# Patient Record
(demographics unavailable — no encounter records)

---

## 2018-01-13 NOTE — ED GU-MALE
General


Chief Complaint:  -Male


Stated Complaint:  CANT URINATE


Nursing Triage Note:  


c/o urinary urgency and unable to void. States he has had urinary symptoms x 6 


months but has been unable to void this morning.


Source:  patient, other (Ex-wife)


Exam Limitations:  no limitations





History of Present Illness


Time seen by provider:  16:16


Initial Comments


Patient resists ER by private conveyance with chief complaint that for the past 

several days she's had bristly worsening severe pain while urinating some small 

amount of purulent looking discharge from urethra and discomfort in his 

suprapubic region and feels that he takes 10-15 minutes to get any urine out 

and starting a stream is difficult for him. He has no known history of 

prostatic hypertrophy or prostatic cancer. He has had one surgery on his 

abdomen to remove a length of colon for diverticulitis. He has not have any 

history of radiation or chemotherapy. He states he is on monogamous 

relationship and that his partner was recently tested and she told him she was 

cleaning. He's had no fevers, nausea, vomiting, chills, diarrhea, constipation. 

No history of trauma. He feels pain and swelling in the bottom side of the 

shaft of his penis. His testicles are non-painful or swollen. He has 

dyspareunia. He is not known to any primary doctor or urologist. He says this 

first started over 10 years ago when he had a surgery on his colon he felt that 

the Mancini catheter left his urethra burning off and on ever since then.





Allergies and Home Medications


Allergies


Coded Allergies:  


     Albuterol (Unverified  Adverse Reaction, Mild, HOT, TINGLY, SHAKING, 3/11/

08)





Constitutional:  No chills, No diaphoresis, No fever


Respiratory:  No cough, No short of breath


Cardiovascular:  No chest pain, No palpitations, No syncope


Gastrointestinal:  abdominal pain (suprapubic mild tenderness and distention), 

No constipation, No diarrhea, No nausea, No vomiting


Genitourinary:  burning, discharge, dysuria, pain, other (urinary hesitancy and 

dyspareunia)


Skin:  No pruritus, No rash





Past Medical-Social-Family Hx


Patient Social History


Alcohol Use:  Denies Use


Recreational Drug Use:  No


Smoking Status:  Current Everyday Smoker


Type Used:  Cigarettes


Recent Foreign Travel:  No


Contact w/Someone Who Travel:  No


Recent Infectious Disease Expo:  No


Recent Hopitalizations:  Yes (9/12/07 DIVERTICULITIS, BOWEL SURGERY, BRONCHITIS

, ET STREP THROAT WHEN PT )





Surgeries


History of Surgeries:  Yes (PARTIAL EXCISION OF LARGE INTESTINE)





Respiratory


History of Respiratory Disorde:  No





Cardiovascular


History of Cardiac Disorders:  No





Neurological


History of Neurological Disord:  No





Reproductive System


Hx Reproductive Disorders:  No


Sexually Transmitted Disease:  Yes (GONORRHEA)





Gastrointestinal


History of Gastrointestinal Di:  Yes





Endocrine


History of Endocrine Disorders:  No





Psychosocial


History of Psychiatric Problem:  No





Blood Transfusions


History of Blood Disorders:  No





Physical Exam


Vital Signs





Vital Sign - Last 12Hours








 1/13/18





 15:48


 


Temp 98.8


 


Pulse 90


 


Resp 16


 


B/P (MAP) 159/107 (124)


 


O2 Delivery Room Air





Capillary Refill : Less Than 3 Seconds


General Appearance:  WD/WN, no apparent distress


HEENT:  PERRL/EOMI, pharynx normal


Neck:  non-tender, normal inspection


Cardiovascular:  normal peripheral pulses, regular rate, rhythm, no edema


Respiratory:  chest non-tender, lungs clear, normal breath sounds


Gastrointestinal:  normal bowel sounds, soft, tenderness (mild tenderness over 

a distended bladder and the superpubic region)


Male:  other (penis has tenderness around the urethra and a small ecchymotic 

looking discoloration at the urethral orifice with a scant amount of purulent 

drainage when expressed. Testicles are's nonswollen nontender and without 

scrotal effusion.)


Neurologic/Psychiatric:  alert, oriented x 3


Skin:  normal color, warm/dry





Progress/Results/Core Measures


Suspected Sepsis


Recent Fever Within 48 Hours:  No


Infection Criteria Present:  Suspected New Infection


New/Unexplained  Altered Menta:  No


Sepsis Screen:  No Definite Risk


Sepsis Diagnosis:  


SIRS


Temperature:98.8 


Pulse: 90 


Respiratory Rate: 16


 


Blood Pressure 159 /107 


Mean: 124





Results/Orders


Lab Results





Laboratory Tests








Test


  1/13/18


16:55 Range/Units


 


 


Urine Color YELLOW   


 


Urine Clarity


  SLIGHTLY


CLOUDY  


 


 


Urine pH 6  5-9  


 


Urine Specific Gravity 1.015 L 1.016-1.022  


 


Urine Protein 1+ H NEGATIVE  


 


Urine Glucose (UA) NEGATIVE  NEGATIVE  


 


Urine Ketones NEGATIVE  NEGATIVE  


 


Urine Nitrite NEGATIVE  NEGATIVE  


 


Urine Bilirubin NEGATIVE  NEGATIVE  


 


Urine Urobilinogen NORMAL  NORMAL  MG/DL


 


Urine Leukocyte Esterase 2+ H NEGATIVE  


 


Urine RBC (Auto) 4+ H NEGATIVE  


 


Urine RBC 5-10 H  /HPF


 


Urine WBC 25-50 H  /HPF


 


Urine Squamous Epithelial


Cells 5-10 


   /HPF


 


 


Urine Crystals NONE   /LPF


 


Urine Bacteria TRACE   /HPF


 


Urine Casts NONE   /LPF


 


Urine Mucus NEGATIVE   /LPF


 


Urine Culture Indicated YES   








My Orders





Orders - LEYLA ENRIQUEZ


Bladder Scan (1/13/18 15:42)


Ua Culture If Indicated (1/13/18 15:42)


Ceftriaxone Injection (Rocephin Injectio (1/13/18 16:30)


Lidocaine 1% Injection (Xylocaine 1% Inj (1/13/18 16:30)


Azithromycin Tablet (Zithromax Tablet) (1/13/18 16:30)


Bladder Scan (1/13/18 16:22)


Mancini Cath Insertion (1/13/18 16:22)


Neis Vlad Dna Urine Test (1/13/18 16:30)


Chlamydia Dna Urine Test (1/13/18 16:30)


Urine Culture (1/13/18 16:55)





Medications Given in ED





Current Medications








 Medications  Dose


 Ordered  Sig/Gen


 Route  Start Time


 Stop Time Status Last Admin


Dose Admin


 


 Azithromycin  1,000 mg  ONCE  ONCE


 PO  1/13/18 16:30


 1/13/18 16:31 DC 1/13/18 17:30


1,000 MG


 


 Ceftriaxone Sodium  250 mg  ONCE  ONCE


 IM  1/13/18 16:30


 1/13/18 16:31 DC 1/13/18 17:31


250 MG


 


 Lidocaine HCl  0.9 ml  ONCE  ONCE


 INJ  1/13/18 16:30


 1/13/18 16:31 DC 1/13/18 17:31


0.9 ML








Vital Signs/I&O





Vital Sign - Last 12Hours








 1/13/18 1/13/18 1/13/18





 15:48 17:31 17:31


 


Temp 98.8 98.8 98.8


 


Pulse 90  


 


Resp 16  


 


B/P (MAP) 159/107 (124)  


 


O2 Delivery Room Air  





Capillary Refill : Less Than 3 Seconds








Blood Pressure Mean:  124








Progress Note :  


   Time:  17:41


Progress Note


Patient states he feels very much relieved after having the Mancini catheter 

placed. We did some teaching. We will talk with the urologist and get him some 

outpatient follow-up. Urinalysis does demonstrate evidence of infection. He's 

gotten the Rocephin and azithromycin and urologist a recommends Vibramycin for 

outpatient therapy and follow-up in the clinic.





Consults


Consults :  


   Consulting Physician:  TAWIL,ELIAS A MD


Consults Notes


Discussed case the labs and findings and he recommends outpatient treatment 

with Vibramycin for 100 mg twice a day 10 days to help a gram positives and 

gram negatives. He feels that it's likely the patient if he's been expressing 

urethritis for this many years establish some large strictures that will need 

treatment in the clinic so he recommends keep Mancini catheter and call him 

Monday or Tuesday to get an appointment to be seen in the clinic.





Departure


Impression


Impression:  


 Primary Impression:  


 Urinary tract infection


 Qualified Codes:  N30.01 - Acute cystitis with hematuria


 Additional Impressions:  


 Urethritis


 Urinary hesitancy


Disposition:  01 HOME, SELF-CARE


Condition:  Improved





Departure-Patient Inst.


Decision time for Depature:  17:43


Referrals:  


NO,LOCAL PHYSICIAN (PCP/Family)


Primary Care Physician


Patient Instructions:  Acute Cystitis (DC), Mancini Catheter, Male





Add. Discharge Instructions:  


Drink plenty of fluids. Keep the Mancini catheter cleaned with your regular soap 

and water. Keep the bag below the level of your bladder at all times to prevent 

refluxing urine into your bladder. Go to the pharmacy and  the 

Vibramycin and take one capsule 100 mg twice a day for the next 10 days. Monday 

or Tuesday call Dr. Tawil, urology at [phone] for an appointment this week.





All discharge instructions reviewed with patient and/or family. Voiced 

understanding.


Scripts


Doxycycline Hyclate (Vibramycin) 100 Mg Capsule


100 MG PO BID for 10 Days, #20 CAP 0 Refills


   Prov: LEYLA ENRIQUEZ         1/13/18





Copy


Copies To 1:   TAWIL,ELIAS A MD WELLER,TITUS J Jan 13, 2018 16:29

## 2020-07-31 NOTE — XMS REPORT
Continuity of Care Document

                             Created on: 2020



SHERINE RAMIREZ

External Reference #: [id number]

: 1973

Sex: Male



Demographics





                          Address                   NONE

[place] [address]

 

                          Home Phone                [phone] x

 

                          Preferred Language        Unknown

 

                          Marital Status            Unknown

 

                          Mandaen Affiliation     Unknown

 

                          Race                      Unknown

 

                          Ethnic Group              Unknown





Author





                          Organization              Unknown

 

                          Address                   Unknown

 

                          Phone                     Unavailable



              



Allergies

      



             Active           Description           Code           Type         

  Severity   

                Reaction           Onset           Reported/Identified          

 

Relationship to Patient                 Clinical Status        

 

             Yes           albuterol           [id number]           Drug Allergy

           

Mild            HOT, TINGLY, SH                           2008            

     

                                                 



                  



Medications

      



There is no data.                  



Problems

      



             Date Dx Coded           Attending           Type           Code    

       

Diagnosis                               Diagnosed By        

 

                2018           LEYLA ENRIQUEZ MD J           Ot            

  F17.210          

                          NICOTINE DEPENDENCE, CIGARETTES, UNCOMPL              

      

 

             2018           LEYLA ENRIQUEZ MD           Ot           N34.

2           

OTHER URETHRITIS                                 

 

             2018           LEYLA ENRIQUEZ MD J           Ot           N39.

0           

URINARY TRACT INFECTION, SITE NOT SPECIF                    

 

             2018           LEYLA ENRIQUEZ MD           Ot           R30.

0           

DYSURIA                                          

 

                2018           LEYLA ENRIQUEZ MD           Ot            

  F17.210          

                          NICOTINE DEPENDENCE, CIGARETTES, UNCOMPL              

      

 

             2018           AUBRIE ENRIQUEZ MDUS J           Ot           N34.

2           

OTHER URETHRITIS                                 

 

             2018           LEYLA ENRIQUEZ MD J           Ot           N39.

0           

URINARY TRACT INFECTION, SITE NOT SPECIF                    

 

             2018           LEYLA ENRIQUEZ MD           Ot           R30.

0           

DYSURIA                                          



                                



Procedures

      



There is no data.                  



Results

      



                    Test                Result              Range        

 

                                        Complete urinalysis with reflex to cultu

re - 18 16:55         

 

                    Urine color determination           YELLOW              NRG 

       

 

                    Urine clarity determination           SLIGHTLY CLOUDY       

     NRG        

 

                    Urine pH measurement by test strip           6              

     5-9        

 

                    Specific gravity of urine by test strip           1.015     

          1.016-1.022  

     

 

                    Urine protein assay by test strip, semi-quantitative        

   1+                  

NEGATIVE        

 

                    Urine glucose detection by automated test strip           NE

GATIVE            

NEGATIVE        

 

                          Erythrocytes detection in urine sediment by light micr

oscopy           4+       

                                        NEGATIVE        

 

                    Urine ketones detection by automated test strip           NE

GATIVE            

NEGATIVE        

 

                    Urine nitrite detection by test strip           NEGATIVE    

        NEGATIVE    

   

 

                    Urine total bilirubin detection by test strip           NEGA

TIVE            

NEGATIVE        

 

                          Urine urobilinogen measurement by automated test strip

 (mass/volume)           

NORMAL                                  NORMAL        

 

                    Urine leukocyte esterase detection by dipstick           2+ 

                 NEGATIVE 

      

 

                                        Automated urine sediment erythrocyte cou

nt by microscopy (number/high power 

field)                     [HPF]                    NRG        

 

                                        Automated urine sediment leukocyte count

 by microscopy (number/high power field)

                           [HPF]                    NRG        

 

                          Bacteria detection in urine sediment by light microsco

py           TRACE        

                                        NRG        

 

                                        Squamous epithelial cells detection in u

rine sediment by light microscopy       

                          5-10                      NRG        

 

                          Crystals detection in urine sediment by light microsco

py           NONE         

                                        NRG        

 

                    Casts detection in urine sediment by light microscopy       

    NONE                

NRG        

 

                          Mucus detection in urine sediment by light microscopy 

          NEGATIVE        

                                        NRG        

 

                    Complete urinalysis with reflex to culture           YES    

             NRG        

 

                                        Bacterial urine culture - 18 16:55

         

 

                    URINE CULTURE RESULTS           MORE THAN 3 ISOLATES        

    NRG        

 

                                        Chlamydia DNA amp probe, urine - 

8 16:55         

 

                    Chlamydia DNA amp probe, urine           Not Detected       

     Not Detected   

     

 

                                        Urine Neisseria gonorrhoeae DNA assay - 

18 16:55         

 

                    Gonorrhea amp DNA-urine           Not Detected            No

t Detected        

 

                                        Complete urinalysis with reflex to cultu

re - 20 08:05         

 

                    Urine color determination           YELLOW              NRG 

       

 

                    Urine clarity determination           CLEAR               NR

G        

 

                    Urine pH measurement by test strip           6.0            

     5-9        

 

                    Specific gravity of urine by test strip           <=        

          1.016-1.022     

   

 

                          Urine protein assay by test strip, semi-quantitative  

         NEGATIVE         

                                        NEGATIVE        

 

                    Urine glucose detection by automated test strip           NE

GATIVE            

NEGATIVE        

 

                          Erythrocytes detection in urine sediment by light micr

oscopy           TRACE-I  

                                        NEGATIVE        

 

                    Urine ketones detection by automated test strip           TR

ACE               

NEGATIVE        

 

                    Urine nitrite detection by test strip           NEGATIVE    

        NEGATIVE    

    

 

                    Urine total bilirubin detection by test strip           NEGA

TIVE            

NEGATIVE        

 

                          Urine urobilinogen measurement by automated test strip

 (mass/volume)           

0.2 mg/dL                               < = 1.0        

 

                    Urine leukocyte esterase detection by dipstick           NEG

ATIVE            

NEGATIVE        

 

                                        Automated urine sediment erythrocyte cou

nt by microscopy (number/high power 

field)                    RARE                      NRG        

 

                                        Automated urine sediment leukocyte count

 by microscopy (number/high power field)

                           [HPF]                    NRG        

 

                          Bacteria detection in urine sediment by light microsco

py           NEGATIVE     

                                        NRG        

 

                                        Squamous epithelial cells detection in u

rine sediment by light microscopy       

                          RARE                      NRG        

 

                          Crystals detection in urine sediment by light microsco

py           NONE         

                                        NRG        

 

                    Casts detection in urine sediment by light microscopy       

    NONE                

NRG        

 

                          Mucus detection in urine sediment by light microscopy 

          NEGATIVE        

                                        NRG        

 

                    Complete urinalysis with reflex to culture           NO     

             NRG        

 

                                        Urine drug screening test - 20 08:

05         

 

                    Urine phencyclidine detection by screening method           

NEGATIVE            

NEGATIVE        

 

                          Urine benzodiazepines detection by screening method   

        NEGATIVE          

                                        NEGATIVE        

 

                    Urine cocaine detection           NEGATIVE            NEGATI

VE        

 

                    Urine amphetamines detection by screening method           N

EGATIVE            

NEGATIVE        

 

                          Urine methamphetamine detection by screening method   

        NEGATIVE          

                                        NEGATIVE        

 

                    Urine cannabinoids detection by screening method           P

OSITIVE            

NEGATIVE        

 

                    Urine opiates detection by screening method           NEGATI

VE            

NEGATIVE        

 

                    Urine barbiturates detection           NEGATIVE            N

EGATIVE        

 

                          Screening urine tricyclic antidepressants detection   

        NEGATIVE          

                                        NEGATIVE        

 

                    Urine methadone detection by screening method           NEGA

TIVE            

NEGATIVE        

 

                    Urine oxycodone detection           NEGATIVE            NEGA

TIVE        

 

                    Urine propoxyphene detection           NEGATIVE            N

EGATIVE        

 

                                        Complete blood count (CBC) with automate

d white blood cell (WBC) differential - 

20 09:16         

 

                          Blood leukocytes automated count (number/volume)      

     13.1 10*3/uL         

                                        4.3-11.0        

 

                          Blood erythrocytes automated count (number/volume)    

       5.31 10*6/uL       

                                        4.35-5.85        

 

                    Venous blood hemoglobin measurement (mass/volume)           

16.5 g/dL           

13.3-17.7        

 

                    Blood hematocrit (volume fraction)           46 %           

     40-54        

 

                    Automated erythrocyte mean corpuscular volume           87 [

foz_us]           

80-99        

 

                                        Automated erythrocyte mean corpuscular h

emoglobin (mass per erythrocyte)        

                          31 pg                     25-34        

 

                                        Automated erythrocyte mean corpuscular h

emoglobin concentration measurement 

(mass/volume)             36 g/dL                   32-36        

 

                    Automated erythrocyte distribution width ratio           13.

0 %              10.0-

14.5        

 

                    Automated blood platelet count (count/volume)           243 

10*3/uL           

130-400        

 

                          Automated blood platelet mean volume measurement      

     9.7 [foz_us]         

                                        7.4-10.4        

 

                    Automated blood neutrophils/100 leukocytes           72 %   

             42-75       

 

 

                    Automated blood lymphocytes/100 leukocytes           19 %   

             12-44       

 

 

                    Blood monocytes/100 leukocytes           9 %                

 0-12        

 

                    Automated blood eosinophils/100 leukocytes           1 %    

             0-10        

 

                    Automated blood basophils/100 leukocytes           0 %      

           0-10        

 

                    Blood neutrophils automated count (number/volume)           

9.4 10*3            

1.8-7.8        

 

                    Blood lymphocytes automated count (number/volume)           

2.5 10*3            

1.0-4.0        

 

                    Blood monocytes automated count (number/volume)           1.

1 10*3            

0.0-1.0        

 

                    Automated eosinophil count           0.1 10*3/uL           0

.0-0.3        

 

                    Automated blood basophil count (count/volume)           0.0 

10*3/uL           

0.0-0.1        

 

                                        Comprehensive metabolic panel - 20

 09:16         

 

                          Serum or plasma sodium measurement (moles/volume)     

      136 mmol/L          

                                        135-145        

 

                          Serum or plasma potassium measurement (moles/volume)  

         3.9 mmol/L       

                                        3.6-5.0        

 

                          Serum or plasma chloride measurement (moles/volume)   

        101 mmol/L        

                                                

 

                    Carbon dioxide           22 mmol/L           21-32        

 

                          Serum or plasma anion gap determination (moles/volume)

           13 mmol/L      

                                        5-14        

 

                          Serum or plasma urea nitrogen measurement (mass/volume

)           10 mg/dL      

                                        7-18        

 

                          Serum or plasma creatinine measurement (mass/volume)  

         0.88 mg/dL       

                                        0.60-1.30        

 

                    Serum or plasma urea nitrogen/creatinine mass ratio         

  11                  NRG 

       

 

                                        Serum or plasma creatinine measurement w

ith calculation of estimated glomerular 

filtration rate           >                         NRG        

 

                    Serum or plasma glucose measurement (mass/volume)           

119 mg/dL           

        

 

                          Serum or plasma calcium measurement (mass/volume)     

      10.4 mg/dL          

                                        8.5-10.1        

 

                          Serum or plasma total bilirubin measurement (mass/volu

me)           0.9 mg/dL   

                                        0.1-1.0        

 

                                        Serum or plasma alkaline phosphatase marco

surement (enzymatic activity/volume)    

                          62 U/L                            

 

                                        Serum or plasma aspartate aminotransfera

se measurement (enzymatic 

activity/volume)           21 U/L                    5-34        

 

                                        Serum or plasma alanine aminotransferase

 measurement (enzymatic activity/volume)

                          20 U/L                    0-55        

 

                    Serum or plasma protein measurement (mass/volume)           

7.7 g/dL            

6.4-8.2        

 

                    Serum or plasma albumin measurement (mass/volume)           

4.5 g/dL            

3.2-4.5        

 

                    CALCIUM CORRECTED           10.0 mg/dL           8.5-10.1   

     

 

                                        Serum or plasma ethanol measurement (mas

s/volume) - 20 09:16         

 

                    Serum or plasma ethanol measurement (mass/volume)           

< mg/dL             

<10        

 

                                        Serum or plasma thyrotropin measurement 

by detection limit <=0.05 miu/l 

(units/volume) - 20 09:16         

 

                                        Serum or plasma thyrotropin measurement 

by detection limit <=0.05 miu/l 

(units/volume)            0.77 u[iU]/mL             0.35-4.94        



                                  



Encounters

      



                ACCT No.           Visit Date/Time           Discharge          

 Status         

             Pt. Type           Provider           Facility           Loc./Unit 

          

Complaint        

 

                    [id number]           2020 08:27:00           

020 15:23:00        

                [facility]             Emergency           YOKO EDMONDS MD      

     [address] 

[facility]           ER                        [facility]

ON        

 

                    [id number]           2018 15:02:00           

018 18:00:00        

                DIS             Emergency           MINA BARBOSA, LEYLA Kelley [facility]           ER                        CANT URINATE

## 2020-07-31 NOTE — NUR
[facility] contacted regarding bed status and whether they had recieved fax info on 
pt. message left on voicemail.

## 2020-07-31 NOTE — ED PSYCHOSOCIAL
General


Chief Complaint:  Suicidal Ideation Risk


Stated Complaint:  SUICIDAL IDEATION


Nursing Triage Note:  


PT PRESENTS TO ED WITH COMPLAINTS OF SUICIDAL IDEATION AND ATTEMPT. PT REPORTS 


HIS MENTAL HEALTH HAS PROGRESSIVELY GOTTEN WORSE AND LAST NIGHT AT 2100 HE TOOK 


10 OF HIS DOGS TRAMADOL AND 20 IBUPROFEN AS WELL AS CONSUMING ETOH, TRYING TO 


KILL HIMSELF. PT ALSO REPORTS HE ATTEMPTED HANGING HIMSELF YESTERDAY AS WELL. PT




REPORTS IN THE PAST HE WAS ABLE TO MANAGE HIS DEPRESSION BUT RECENTLY HE HAS NOT




BEEN ABLE TO COPE. PT REPORTS HE IS NOT ANY MEDICATION AND DOES NOT SEE ANYONE 


FOR MENTAL HEALTH.


Source:  patient


Exam Limitations:  no limitations





History of Present Illness


Date Seen by Provider:  Jul 31, 2020


Time Seen by Provider:  08:30


Initial Comments


Here with report of suicidal ideation and attempt. He apparently took 20-30 

ibuprofen last night as well as 10 of his dogs tramadol. Apparently he was 

kicked out of his house yesterday after making comments that he may need to take

out himself and his wife. She has restraining order against him now. He states 

that that wasn't offhanded comment because of his struggle with depression. He 

is contemplating suicide and did attempt suicide with the pills and a failed 

hanging last night. Denies neck pain or breathing problems. He is tearful and 

anxious. States that his mind races all the time. He has been previously on 

antidepressants in the past but hasn't been on anything in a long time. Doesn't 

really see a physician. Does not follow with mental health. Remains suicidal and

acutely significantly depressed. Denies homicidality currently.


Timing/Duration:  week, getting worse


Severity:  severe


Associated Symptoms:  anxiety, impaired concentration, ingestion, suicidal 

ideation





Allergies and Home Medications


Allergies


Coded Allergies:  


     Albuterol (Unverified  Adverse Reaction, Mild, HOT, TINGLY, SHAKING, 

3/11/08)





Home Medications


No Active Prescriptions or Reported Meds





Patient Home Medication List


Home Medication List Reviewed:  Yes





Review of Systems


Constitutional:  see HPI; No chills, No fever


EENTM:  no symptoms reported


Respiratory:  No cough, No short of breath


Cardiovascular:  No chest pain, No edema


Gastrointestinal:  No abdominal pain, No nausea, No vomiting


Genitourinary:  no symptoms reported


Musculoskeletal:  no symptoms reported


Skin:  no symptoms reported


Psychiatric/Neurological:  See HPI, Anxiety, Depressed, Emotional Problems





All Other Systems Reviewed


Negative Unless Noted:  Yes





Past Medical-Social-Family Hx


Past Med/Social Hx:  Reviewed Nursing Past Med/Soc Hx


Patient Social History


Alcohol Use:  Rarely Uses


Number of Drinks Today:  0


Recreational Drug Use:  No


Drug of Choice:  past hx 15 years ago


Smoking Status:  Current Everyday Smoker


Type Used:  Cigarettes


Recent Foreign Travel:  No


Contact w/Someone Who Travel:  No


Recent Infectious Disease Expo:  No


Recent Hopitalizations:  Yes (9/12/07 DIVERTICULITIS, BOWEL SURGERY, BRONCHITIS,

ET STREP THROAT WHEN PT )


Physical Abuse:  No


Sexual Abuse:  No


Mistreated:  No


Fear:  No





Past Medical History


Surgeries:  Yes (PARTIAL EXCISION OF LARGE INTESTINE r/t diverticulitis)


Respiratory:  No


Cardiac:  No


Neurological:  No


Reproductive Disorders:  No


Sexually Transmitted Disease:  Yes (GONORRHEA)


Gastrointestinal:  Yes


Endocrine:  No


Psychosocial:  Yes


Depression


Blood Disorders:  No





Family Medical History


Reviewed Nursing Family Hx


Heart Disease, Diabetes, Hypertension





Physical Exam





Vital Signs - First Documented








 7/31/20





 08:34


 


Temp 37.0


 


Pulse 96


 


Resp 20


 


B/P (MAP) 173/97 (122)


 


Pulse Ox 97





Capillary Refill : Less Than 3 Seconds


Height, Weight, BMI


Height: 5'5.00"


Weight: 175lbs. oz. [id number]; 28.00 BMI


Method:Stated


General Appearance:  WD/WN, mild distress


HEENT:  PERRL/EOMI, pharynx normal


Neck:  non-tender, full range of motion, supple, normal inspection


Respiratory:  chest non-tender, lungs clear, normal breath sounds


Cardiovascular:  no murmur, tachycardia


Gastrointestinal:  non tender, soft


Extremities:  non-tender, normal inspection


Neurologic/Psychiatric:  alert, oriented x 3


Appearance/Memory:  appropriate insight, disheveled


Behavior/Eye Contact:  cooperative, good eye contact, increased rate of speech


Thoughts/Hallucinations:  normal thought pattern, no apparent hallucination


Skin:  normal color, warm/dry; No ecchymosis





Progress/Results/Core Measures


Results/Orders


Lab Results





Laboratory Tests








Test


 7/31/20


08:05 7/31/20


09:16 Range/Units


 


 


Urine Color YELLOW    


 


Urine Clarity CLEAR    


 


Urine pH 6.0   5-9  


 


Urine Specific Gravity <=1.005   1.016-1.022  


 


Urine Protein NEGATIVE   NEGATIVE  


 


Urine Glucose (UA) NEGATIVE   NEGATIVE  


 


Urine Ketones TRACE H  NEGATIVE  


 


Urine Nitrite NEGATIVE   NEGATIVE  


 


Urine Bilirubin NEGATIVE   NEGATIVE  


 


Urine Urobilinogen 0.2   < = 1.0  MG/DL


 


Urine Leukocyte Esterase NEGATIVE   NEGATIVE  


 


Urine RBC (Auto) TRACE-I   NEGATIVE  


 


Urine RBC RARE    /HPF


 


Urine WBC 0-2    /HPF


 


Urine Squamous Epithelial


Cells RARE 


 


  /HPF





 


Urine Crystals NONE    /LPF


 


Urine Bacteria NEGATIVE    /HPF


 


Urine Casts NONE    /LPF


 


Urine Mucus NEGATIVE    /LPF


 


Urine Culture Indicated NO    


 


Urine Opiates Screen NEGATIVE   NEGATIVE  


 


Urine Oxycodone Screen NEGATIVE   NEGATIVE  


 


Urine Methadone Screen NEGATIVE   NEGATIVE  


 


Urine Propoxyphene Screen NEGATIVE   NEGATIVE  


 


Urine Barbiturates Screen NEGATIVE   NEGATIVE  


 


Ur Tricyclic Antidepressants


Screen NEGATIVE 


 


 NEGATIVE  





 


Urine Phencyclidine Screen NEGATIVE   NEGATIVE  


 


Urine Amphetamines Screen NEGATIVE   NEGATIVE  


 


Urine Methamphetamines Screen NEGATIVE   NEGATIVE  


 


Urine Benzodiazepines Screen NEGATIVE   NEGATIVE  


 


Urine Cocaine Screen NEGATIVE   NEGATIVE  


 


Urine Cannabinoids Screen POSITIVE H  NEGATIVE  


 


White Blood Count


 


 13.1 H


 4.3-11.0


10^3/uL


 


Red Blood Count


 


 5.31 


 4.35-5.85


10^6/uL


 


Hemoglobin  16.5  13.3-17.7  G/DL


 


Hematocrit  46  40-54  %


 


Mean Corpuscular Volume  87  80-99  FL


 


Mean Corpuscular Hemoglobin  31  25-34  PG


 


Mean Corpuscular Hemoglobin


Concent 


 36 


 32-36  G/DL





 


Red Cell Distribution Width  13.0  10.0-14.5  %


 


Platelet Count


 


 243 


 130-400


10^3/uL


 


Mean Platelet Volume  9.7  7.4-10.4  FL


 


Neutrophils (%) (Auto)  72  42-75  %


 


Lymphocytes (%) (Auto)  19  12-44  %


 


Monocytes (%) (Auto)  9  0-12  %


 


Eosinophils (%) (Auto)  1  0-10  %


 


Basophils (%) (Auto)  0  0-10  %


 


Neutrophils # (Auto)  9.4 H 1.8-7.8  X 10^3


 


Lymphocytes # (Auto)  2.5  1.0-4.0  X 10^3


 


Monocytes # (Auto)  1.1 H 0.0-1.0  X 10^3


 


Eosinophils # (Auto)


 


 0.1 


 0.0-0.3


10^3/uL


 


Basophils # (Auto)


 


 0.0 


 0.0-0.1


10^3/uL


 


Sodium Level  136  135-145  MMOL/L


 


Potassium Level  3.9  3.6-5.0  MMOL/L


 


Chloride Level  101    MMOL/L


 


Carbon Dioxide Level  22  21-32  MMOL/L


 


Anion Gap  13  5-14  MMOL/L


 


Blood Urea Nitrogen  10  7-18  MG/DL


 


Creatinine


 


 0.88 


 0.60-1.30


MG/DL


 


Estimat Glomerular Filtration


Rate 


 > 60 


  





 


BUN/Creatinine Ratio  11   


 


Glucose Level  119 H   MG/DL


 


Calcium Level  10.4 H 8.5-10.1  MG/DL


 


Corrected Calcium  10.0  8.5-10.1  MG/DL


 


Total Bilirubin  0.9  0.1-1.0  MG/DL


 


Aspartate Amino Transf


(AST/SGOT) 


 21 


 5-34  U/L





 


Alanine Aminotransferase


(ALT/SGPT) 


 20 


 0-55  U/L





 


Alkaline Phosphatase  62    U/L


 


Total Protein  7.7  6.4-8.2  GM/DL


 


Albumin  4.5  3.2-4.5  GM/DL


 


TSH Cascade Testing


 


 0.77 


 0.35-4.94


UIU/ML


 


Salicylates Level  < 5.0 L 5.0-20.0  MG/DL


 


Acetaminophen Level  < 10 L 10-30  UG/ML


 


Serum Alcohol  < 10  <10  MG/DL








My Orders





Orders - YOKO EDMONDS MD


Ua Culture If Indicated (7/31/20 08:40)


Cbc With Automated Diff (7/31/20 08:40)


Comprehensive Metabolic Panel (7/31/20 08:40)


Alcohol (7/31/20 08:40)


Drug Screen Stat (Urine) (7/31/20 08:40)


Acetaminophen (7/31/20 08:40)


Salicylate (7/31/20 08:40)


Ekg Tracing (7/31/20 08:40)


Ed Iv/Invasive Line Start (7/31/20 08:40)


Thyroid Analyzer (7/31/20 08:40)


Monitor-Rhythm Ecg Trace Only (7/31/20 08:40)


Bh Status Checks/Observation Q15M (7/31/20 08:40)


Ed Iv/Invasive Line Start (7/31/20 08:40)


General/Regular (7/31/20 Breakfast)


Amlodipine Tablet (Norvasc Tablet) (7/31/20 10:30)





Medications Given in ED





Current Medications








 Medications  Dose


 Ordered  Sig/Gen


 Route  Start Time


 Stop Time Status Last Admin


Dose Admin


 


 Amlodipine


 Besylate  5 mg  ONCE  ONCE


 PO  7/31/20 10:30


 7/31/20 10:31 DC 7/31/20 10:27


5 MG








Vital Signs/I&O











 7/31/20





 08:34


 


Temp 37.0


 


Pulse 96


 


Resp 20


 


B/P (MAP) 173/97 (122)


 


Pulse Ox 97














Blood Pressure Mean:                    122











Progress


Progress Note :  


Progress Note


Seen and evaluated. Labs, UA and EKG ordered. Poison control recontacted 

although patient should be well outside of a toxicity window since the overdose 

was at 9 PM last night. Monitor patient. 1010: Patient medically cleared for 

inpatient psychiatric stay. We will initiate trying to find facility. Monitor 

patient. 1248: Remains calm and without distress. We're working on finding 

facility still. Monitor patient. 1321: I did discuss the case with Dr. Daniels 

at [facility] in [place], [place]. He has accepted the 

patient for transfer. Pending bed number. Discussed with the patient who agrees.


Initial ECG Impression Date:  Jul 31, 2020


Initial ECG Impression Time:  08:50


Initial ECG Rate:  86


Initial ECG Rhythm:  Normal Sinus


Initial ECG Impression:  Normal


Comment


Normal sinus rhythm with normal axis. No evidence of ST elevation MI. No 

Previous available for comparison. Interpreted by me.





Departure


Impression





   Primary Impression:  


   Suicide attempt


   Additional Impressions:  


   Suicidal ideation


   Severe depression


Disposition:  02 XFER S[facility]-TRM HOSP


Condition:  Stable





Transfer


Transfer Reason:  Exceeds level of care


Time Spoke to Accepting Phy:  13:21


Transfer Facility:  


[place], [place], Dr. Daniels accepting


Method of Transfer:  Private Vehicle ([facility] hospital transport)





Departure-Patient Inst.


Referrals:  


NO,LOCAL PHYSICIAN (PCP/Family)


Primary Care Physician


Patient Instructions:  OUTPT MENTAL HEALTH SERVICES


Scripts


No Active Prescriptions or Reported Meds











YOKO EDMONDS MD          Jul 31, 2020 09:42